# Patient Record
(demographics unavailable — no encounter records)

---

## 2024-12-05 NOTE — PHYSICAL EXAM
[Inflamed Nasal Mucosa] : inflamed nasal mucosa [Hypertrophied Nasal Mucosa] : hypertrophied nasal mucosa [NL] : regular rate and rhythm, normal S1, S2 audible, no murmurs [FreeTextEntry5] : glassy [de-identified] : postnasal drip, +2 tonsils, no exudate

## 2024-12-05 NOTE — REVIEW OF SYSTEMS
[Headache] : headache [Nasal Discharge] : nasal discharge [Nasal Congestion] : nasal congestion [Sinus Pressure] : sinus pressure [Sore Throat] : sore throat [Negative] : Genitourinary [Fever] : no fever [Cough] : no cough

## 2024-12-05 NOTE — HISTORY OF PRESENT ILLNESS
[FreeTextEntry6] : 15 y/o presents with sore throat, body aches and weakness, headaches and nasal congestion x 1 day. Afebrile in office. Friend was sick with a cough.

## 2024-12-05 NOTE — DISCUSSION/SUMMARY
[FreeTextEntry1] : 15 year male with viral pharyngitis/postnasal drip. Rapid strep negative. Recommend tylenol/motrin for pain or fever, gargle with salt water, and throat lozenges as needed. Encourage fluids and rest. Return to office if symptoms worsen or persist. Flu panel sent out to lab. Will review results when available. Sudafed or flonase to help with nasal congestion symptoms.

## 2024-12-30 NOTE — HISTORY OF PRESENT ILLNESS
[Mother] : mother [Yes] : Patient goes to dentist yearly [Toothpaste] : Primary Fluoride Source: Toothpaste [Eats meals with family] : eats meals with family [Has family members/adults to turn to for help] : has family members/adults to turn to for help [Is permitted and is able to make independent decisions] : Is permitted and is able to make independent decisions [Grade: ____] : Grade: [unfilled] [Normal Performance] : normal performance [Normal Behavior/Attention] : normal behavior/attention [Normal Homework] : normal homework [Eats regular meals including adequate fruits and vegetables] : eats regular meals including adequate fruits and vegetables [Drinks non-sweetened liquids] : drinks non-sweetened liquids  [Calcium source] : calcium source [Has concerns about body or appearance] : has concerns about body or appearance [Has friends] : has friends [Has interests/participates in community activities/volunteers] : has interests/participates in community activities/volunteers. [No] : No cigarette smoke exposure [Uses safety belts/safety equipment] : uses safety belts/safety equipment  [Has peer relationships free of violence] : has peer relationships free of violence [Has ways to cope with stress] : has ways to cope with stress [Displays self-confidence] : displays self-confidence [With Teen] : teen [Needs Immunizations] : Needs immunizations [Sleep Concerns] : no sleep concerns [At least 1 hour of physical activity a day] : does not do at least 1 hour of physical activity a day [Screen time (except homework) less than 2 hours a day] : no screen time (except homework) less than 2 hours a day [Uses electronic nicotine delivery system] : does not use electronic nicotine delivery system [Exposure to electronic nicotine delivery system] : no exposure to electronic nicotine delivery system [Uses tobacco] : does not use tobacco [Exposure to tobacco] : no exposure to tobacco [Uses drugs] : does not use drugs  [Exposure to drugs] : no exposure to drugs [Drinks alcohol] : does not drink alcohol [Exposure to alcohol] : no exposure to alcohol [Impaired/distracted driving] : no impaired/distracted driving [Has problems with sleep] : does not have problems with sleep [Gets depressed, anxious, or irritable/has mood swings] : does not get depressed, anxious, or irritable/has mood swings [Has thought about hurting self or considered suicide] : has not thought about hurting self or considered suicide [FreeTextEntry7] : This patient has been healthy. They have had no ER or specialist visits this past year. [de-identified] : none [de-identified] : HPV #2 and influenza vaccine [de-identified] : ice hockey, [place]
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
## 2024-12-30 NOTE — DISCUSSION/SUMMARY
[Physical Growth and Development] : physical growth and development [Social and Academic Competence] : social and academic competence [Emotional Well-Being] : emotional well-being [Risk Reduction] : risk reduction [Violence and Injury Prevention] : violence and injury prevention [FreeTextEntry1] : This is a 15 yo  adolescent male here for routine exam and immunizations.  The patient shows good growth and development from previous exam last year.  Physical exam is within normal limits.  Urology-followed for varicoceles twice a year Pulmonary-has been stable has had no reactive airway greater than 2 years saw pulmonologist in the past and was discharged from care as per mom.  Immunizations were discussed.  flu and hpv #2 offered and given.  script for blood work given.  Healthy diet was discussed at length and discussed importance of exercise and healthy lifestyle.   Patient is to return in 1 year for routine exam and immunizations.

## 2024-12-30 NOTE — HISTORY OF PRESENT ILLNESS
[Mother] : mother [Yes] : Patient goes to dentist yearly [Toothpaste] : Primary Fluoride Source: Toothpaste [Eats meals with family] : eats meals with family [Has family members/adults to turn to for help] : has family members/adults to turn to for help [Is permitted and is able to make independent decisions] : Is permitted and is able to make independent decisions [Grade: ____] : Grade: [unfilled] [Normal Performance] : normal performance [Normal Behavior/Attention] : normal behavior/attention [Normal Homework] : normal homework [Eats regular meals including adequate fruits and vegetables] : eats regular meals including adequate fruits and vegetables [Drinks non-sweetened liquids] : drinks non-sweetened liquids  [Calcium source] : calcium source [Has concerns about body or appearance] : has concerns about body or appearance [Has friends] : has friends [Has interests/participates in community activities/volunteers] : has interests/participates in community activities/volunteers. [No] : No cigarette smoke exposure [Uses safety belts/safety equipment] : uses safety belts/safety equipment  [Has peer relationships free of violence] : has peer relationships free of violence [Has ways to cope with stress] : has ways to cope with stress [Displays self-confidence] : displays self-confidence [With Teen] : teen [Needs Immunizations] : Needs immunizations [Sleep Concerns] : no sleep concerns [At least 1 hour of physical activity a day] : does not do at least 1 hour of physical activity a day [Screen time (except homework) less than 2 hours a day] : no screen time (except homework) less than 2 hours a day [Uses electronic nicotine delivery system] : does not use electronic nicotine delivery system [Exposure to electronic nicotine delivery system] : no exposure to electronic nicotine delivery system [Uses tobacco] : does not use tobacco [Exposure to tobacco] : no exposure to tobacco [Uses drugs] : does not use drugs  [Exposure to drugs] : no exposure to drugs [Drinks alcohol] : does not drink alcohol [Exposure to alcohol] : no exposure to alcohol [Impaired/distracted driving] : no impaired/distracted driving [Has problems with sleep] : does not have problems with sleep [Gets depressed, anxious, or irritable/has mood swings] : does not get depressed, anxious, or irritable/has mood swings [Has thought about hurting self or considered suicide] : has not thought about hurting self or considered suicide [FreeTextEntry7] : This patient has been healthy. They have had no ER or specialist visits this past year. [de-identified] : none [de-identified] : HPV #2 and influenza vaccine [de-identified] : ice hockey, [place]

## 2025-03-25 NOTE — PHYSICAL EXAM
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

## 2025-03-25 NOTE — CONSULT LETTER
[FreeTextEntry1] : Dear Dr. XIOMY MURCIA ,  I had the pleasure of seeing  ROSLYN MULLEN for follow up today.  Below is my note regarding the office visit today.  Thank you so very much for allowing me to participate in ROSLYN's  care.  Please don't hesitate to call me should any questions or issues arise .  Sincerely,   Gustavo Mejía MD, FACS, FSPU Chief, Pediatric Urology Professor of Urology and Pediatrics [facility] [employer]  President, [employer] - [place] Section Past-President, Societies for Pediatric Urology

## 2025-03-25 NOTE — HISTORY OF PRESENT ILLNESS
[TextBox_4] :  ROSLYN is here for follow up of his Grade 2 left varicocele.  Since the last visit, he reports that he has not detected an increase in size of the varicocele or the testes.  There is no reported pain or redness, even with physical activity  Of note, father with history of varicocelectomy.

## 2025-03-25 NOTE — CONSULT LETTER
[FreeTextEntry1] : Dear Dr. XIOMY MURCIA ,  I had the pleasure of seeing  ROSLYN MULLEN for follow up today.  Below is my note regarding the office visit today.  Thank you so very much for allowing me to participate in ROSLYN's  care.  Please don't hesitate to call me should any questions or issues arise .  Sincerely,   Gustavo Mejía MD, FACS, FSPU Chief, Pediatric Urology Professor of Urology and Pediatrics [facility] [employer]  President, [employer] - [place] Section Past-President, Societies for Pediatric Urology

## 2025-03-25 NOTE — ASSESSMENT
[FreeTextEntry1] : ROSLYN has a stable left varicocele.  Currently, there is no surgical indication. My recommendation is to observe the varicocele and to follow up in 18 months for another examination and scrotal ultrasound. All questions were answered

## 2025-03-25 NOTE — CONSULT LETTER
[FreeTextEntry1] : Dear Dr. XIOMY MURCIA ,  I had the pleasure of seeing  ROSLYN MULLEN for follow up today.  Below is my note regarding the office visit today.  Thank you so very much for allowing me to participate in ROSLYN's  care.  Please don't hesitate to call me should any questions or issues arise .  Sincerely,   Gustavo Mejía MD, FACS, FSPU Chief, Pediatric Urology Professor of Urology and Pediatrics [facility] [employer]  President, [employer] - [place] Section Past-President, Societies for Pediatric Urology